# Patient Record
Sex: MALE | Race: WHITE | NOT HISPANIC OR LATINO | URBAN - NONMETROPOLITAN AREA
[De-identification: names, ages, dates, MRNs, and addresses within clinical notes are randomized per-mention and may not be internally consistent; named-entity substitution may affect disease eponyms.]

---

## 2021-01-14 ENCOUNTER — IMPORTED ENCOUNTER (OUTPATIENT)
Dept: URBAN - NONMETROPOLITAN AREA CLINIC 1 | Facility: CLINIC | Age: 56
End: 2021-01-14

## 2021-01-14 PROBLEM — H52.03: Noted: 2021-01-14

## 2021-01-14 PROBLEM — H52.223: Noted: 2021-01-14

## 2021-01-14 PROBLEM — H52.4: Noted: 2021-01-14

## 2021-01-14 PROCEDURE — S0621 ROUTINE OPHTHALMOLOGICAL EXA: HCPCS

## 2021-01-14 NOTE — PATIENT DISCUSSION
Compound Hyperopic Astigmatism OU w/Presbyopia-  discussed findings w/patient-  patient defers Rx at this time-  continue to monitor yearly or prn; 's Notes: MR 1/14/2021DFE 1/14/2021

## 2022-04-10 ASSESSMENT — TONOMETRY
OD_IOP_MMHG: 17
OS_IOP_MMHG: 17

## 2022-04-10 ASSESSMENT — VISUAL ACUITY
OD_CC: 20/30+2
OS_CC: 20/40+2
OU_CC: 20/25
OU_SC: 20/60

## 2023-10-13 ENCOUNTER — COMPREHENSIVE EXAM (OUTPATIENT)
Dept: URBAN - NONMETROPOLITAN AREA CLINIC 4 | Facility: CLINIC | Age: 58
End: 2023-10-13

## 2023-10-13 DIAGNOSIS — H25.813: ICD-10-CM

## 2023-10-13 DIAGNOSIS — H52.4: ICD-10-CM

## 2023-10-13 DIAGNOSIS — H52.223: ICD-10-CM

## 2023-10-13 DIAGNOSIS — H52.03: ICD-10-CM

## 2023-10-13 DIAGNOSIS — S09.8XXA: ICD-10-CM

## 2023-10-13 PROCEDURE — 99214 OFFICE O/P EST MOD 30 MIN: CPT

## 2023-10-13 PROCEDURE — 92015 DETERMINE REFRACTIVE STATE: CPT

## 2023-10-13 ASSESSMENT — TONOMETRY
OD_IOP_MMHG: 19
OS_IOP_MMHG: 20

## 2023-10-13 ASSESSMENT — VISUAL ACUITY
OS_SC: 20/40
OS_PH: 20/25
OD_SC: 20/40-2
OD_PH: 20/25